# Patient Record
Sex: FEMALE | Race: WHITE | Employment: FULL TIME | URBAN - NONMETROPOLITAN AREA
[De-identification: names, ages, dates, MRNs, and addresses within clinical notes are randomized per-mention and may not be internally consistent; named-entity substitution may affect disease eponyms.]

---

## 2018-03-19 ENCOUNTER — OFFICE VISIT (OUTPATIENT)
Dept: FAMILY MEDICINE CLINIC | Age: 38
End: 2018-03-19

## 2018-03-19 VITALS
SYSTOLIC BLOOD PRESSURE: 110 MMHG | DIASTOLIC BLOOD PRESSURE: 72 MMHG | HEART RATE: 121 BPM | OXYGEN SATURATION: 97 % | BODY MASS INDEX: 31.98 KG/M2 | WEIGHT: 173.8 LBS | TEMPERATURE: 99.3 F | HEIGHT: 62 IN

## 2018-03-19 DIAGNOSIS — R00.0 TACHYCARDIA: ICD-10-CM

## 2018-03-19 DIAGNOSIS — R07.81 PLEURITIC CHEST PAIN: Primary | ICD-10-CM

## 2018-03-19 PROCEDURE — 1036F TOBACCO NON-USER: CPT | Performed by: FAMILY MEDICINE

## 2018-03-19 PROCEDURE — G8484 FLU IMMUNIZE NO ADMIN: HCPCS | Performed by: FAMILY MEDICINE

## 2018-03-19 PROCEDURE — G8427 DOCREV CUR MEDS BY ELIG CLIN: HCPCS | Performed by: FAMILY MEDICINE

## 2018-03-19 PROCEDURE — 99214 OFFICE O/P EST MOD 30 MIN: CPT | Performed by: FAMILY MEDICINE

## 2018-03-19 PROCEDURE — G8417 CALC BMI ABV UP PARAM F/U: HCPCS | Performed by: FAMILY MEDICINE

## 2018-03-19 ASSESSMENT — ENCOUNTER SYMPTOMS
DIARRHEA: 0
NAUSEA: 0
COUGH: 0
SHORTNESS OF BREATH: 1
SORE THROAT: 0

## 2018-03-19 ASSESSMENT — PATIENT HEALTH QUESTIONNAIRE - PHQ9
SUM OF ALL RESPONSES TO PHQ9 QUESTIONS 1 & 2: 0
SUM OF ALL RESPONSES TO PHQ QUESTIONS 1-9: 0
2. FEELING DOWN, DEPRESSED OR HOPELESS: 0
1. LITTLE INTEREST OR PLEASURE IN DOING THINGS: 0

## 2019-01-28 ENCOUNTER — TELEPHONE (OUTPATIENT)
Dept: FAMILY MEDICINE CLINIC | Age: 39
End: 2019-01-28

## 2019-01-28 RX ORDER — OSELTAMIVIR PHOSPHATE 75 MG/1
75 CAPSULE ORAL DAILY
Qty: 10 CAPSULE | Refills: 0 | Status: SHIPPED | OUTPATIENT
Start: 2019-01-28 | End: 2019-02-07

## 2020-09-28 ENCOUNTER — OFFICE VISIT (OUTPATIENT)
Dept: FAMILY MEDICINE CLINIC | Age: 40
End: 2020-09-28
Payer: COMMERCIAL

## 2020-09-28 VITALS
TEMPERATURE: 98.2 F | HEART RATE: 72 BPM | BODY MASS INDEX: 29.63 KG/M2 | WEIGHT: 161 LBS | HEIGHT: 62 IN | OXYGEN SATURATION: 98 % | DIASTOLIC BLOOD PRESSURE: 88 MMHG | SYSTOLIC BLOOD PRESSURE: 122 MMHG

## 2020-09-28 PROCEDURE — 36415 COLL VENOUS BLD VENIPUNCTURE: CPT | Performed by: FAMILY MEDICINE

## 2020-09-28 PROCEDURE — 99396 PREV VISIT EST AGE 40-64: CPT | Performed by: FAMILY MEDICINE

## 2020-09-28 RX ORDER — NORGESTIMATE AND ETHINYL ESTRADIOL 0.25-0.035
KIT ORAL
COMMUNITY
Start: 2020-09-24

## 2020-09-28 ASSESSMENT — ENCOUNTER SYMPTOMS
BLOOD IN STOOL: 0
SHORTNESS OF BREATH: 0
CONSTIPATION: 0
DIARRHEA: 0

## 2020-09-28 ASSESSMENT — PATIENT HEALTH QUESTIONNAIRE - PHQ9
SUM OF ALL RESPONSES TO PHQ QUESTIONS 1-9: 0
1. LITTLE INTEREST OR PLEASURE IN DOING THINGS: 0
SUM OF ALL RESPONSES TO PHQ9 QUESTIONS 1 & 2: 0
2. FEELING DOWN, DEPRESSED OR HOPELESS: 0
SUM OF ALL RESPONSES TO PHQ QUESTIONS 1-9: 0

## 2020-09-28 NOTE — PROGRESS NOTES
Chief Complaint   Patient presents with    Annual Exam     Patient here for annual exam.       HPI:  Al Santos is a 35 y.o. (: 1980) here today   for annual wellness exam.    Overall doing well. Sig family hx of colon cancer. Sister w/ cancer. Brother and sister w/ polyps. Dad w/ colon cancer. Had presented around age 35. No changes in bowel habits or blood in stool. Has been having ongoing issues w/ fatigue. Does have some daytime sleepiness. Does snore. No apneic episodes. Does have a family hx of thyroid issues. HPI    Patient's medications, allergies, past medical, surgical, social and family histories were reviewed and updated as appropriate. ROS:  Review of Systems   Constitutional: Positive for fatigue. Negative for fever. Respiratory: Negative for shortness of breath. Cardiovascular: Negative for chest pain and palpitations. Gastrointestinal: Negative for blood in stool, constipation and diarrhea. No results found for: LABA1C, LABMICR, LDLCALC    No past medical history on file.     Family History   Problem Relation Age of Onset    Colon Cancer Father        Social History     Socioeconomic History    Marital status:      Spouse name: Not on file    Number of children: Not on file    Years of education: Not on file    Highest education level: Not on file   Occupational History    Not on file   Social Needs    Financial resource strain: Not on file    Food insecurity     Worry: Not on file     Inability: Not on file    Transportation needs     Medical: Not on file     Non-medical: Not on file   Tobacco Use    Smoking status: Never Smoker    Smokeless tobacco: Never Used   Substance and Sexual Activity    Alcohol use: Not on file    Drug use: Not on file    Sexual activity: Not on file   Lifestyle    Physical activity     Days per week: Not on file     Minutes per session: Not on file    Stress: Not on file   Relationships    Social connections     Talks on phone: Not on file     Gets together: Not on file     Attends Congregational service: Not on file     Active member of club or organization: Not on file     Attends meetings of clubs or organizations: Not on file     Relationship status: Not on file    Intimate partner violence     Fear of current or ex partner: Not on file     Emotionally abused: Not on file     Physically abused: Not on file     Forced sexual activity: Not on file   Other Topics Concern    Not on file   Social History Narrative    Not on file       Prior to Visit Medications    Medication Sig Taking? Authorizing Provider   norgestimate-ethinyl estradiol (ORTHO-CYCLEN) 0.25-35 MG-MCG per tablet   Historical Provider, MD       No Known Allergies    OBJECTIVE:    /88   Pulse 72   Temp 98.2 °F (36.8 °C)   Ht 5' 2\" (1.575 m)   Wt 161 lb (73 kg)   SpO2 98%   BMI 29.45 kg/m²     BP Readings from Last 2 Encounters:   09/28/20 122/88   03/19/18 110/72       Wt Readings from Last 3 Encounters:   09/28/20 161 lb (73 kg)   03/19/18 173 lb 12.8 oz (78.8 kg)   10/24/16 152 lb (68.9 kg)       Physical Exam  Constitutional:       Appearance: She is well-developed. HENT:      Head: Normocephalic and atraumatic. Right Ear: Hearing normal.      Left Ear: Hearing normal.   Eyes:      Conjunctiva/sclera: Conjunctivae normal.   Neck:      Trachea: No tracheal deviation. Cardiovascular:      Rate and Rhythm: Normal rate and regular rhythm. Pulmonary:      Effort: Pulmonary effort is normal.      Breath sounds: Normal breath sounds. Abdominal:      Palpations: Abdomen is soft. Tenderness: There is no abdominal tenderness. Musculoskeletal:      Right lower leg: No edema. Left lower leg: No edema. Skin:     General: Skin is warm and dry. Neurological:      Mental Status: She is alert and oriented to person, place, and time.    Psychiatric:         Mood and Affect: Mood normal.         Behavior: Behavior normal. ASSESSMENT/PLAN:    1. Well adult exam  Overall doing well. Family hx of colon cancer.    - Comprehensive Metabolic Panel  - CBC Auto Differential  - Lipid Panel    2. Family history of colon cancer  See above. Refer for eval.  Will likely need Angelic Morel MD, Gastroenterology, [facility]    3. Other fatigue  Denies sig heavy menstruation. Does snore and some daytime sleepiness. Consider sleep study if labs unremarkable. - T4, Free  - Comprehensive Metabolic Panel  - TSH without Reflex  - CBC Auto Differential  - Vitamin B12    4.  Screening cholesterol level    - Lipid Panel

## 2020-09-29 LAB
A/G RATIO: 1.9 (ref 1.1–2.2)
ALBUMIN SERPL-MCNC: 4.4 G/DL (ref 3.4–5)
ALP BLD-CCNC: 80 U/L (ref 40–129)
ALT SERPL-CCNC: 12 U/L (ref 10–40)
ANION GAP SERPL CALCULATED.3IONS-SCNC: 11 MMOL/L (ref 3–16)
AST SERPL-CCNC: 14 U/L (ref 15–37)
BASOPHILS ABSOLUTE: 0 K/UL (ref 0–0.2)
BASOPHILS RELATIVE PERCENT: 1 %
BILIRUB SERPL-MCNC: 0.3 MG/DL (ref 0–1)
BUN BLDV-MCNC: 7 MG/DL (ref 7–20)
CALCIUM SERPL-MCNC: 9.5 MG/DL (ref 8.3–10.6)
CHLORIDE BLD-SCNC: 103 MMOL/L (ref 99–110)
CHOLESTEROL, TOTAL: 190 MG/DL (ref 0–199)
CO2: 26 MMOL/L (ref 21–32)
CREAT SERPL-MCNC: 0.6 MG/DL (ref 0.6–1.1)
EOSINOPHILS ABSOLUTE: 0.1 K/UL (ref 0–0.6)
EOSINOPHILS RELATIVE PERCENT: 2 %
GFR AFRICAN AMERICAN: >60
GFR NON-AFRICAN AMERICAN: >60
GLOBULIN: 2.3 G/DL
GLUCOSE BLD-MCNC: 77 MG/DL (ref 70–99)
HCT VFR BLD CALC: 39.3 % (ref 36–48)
HDLC SERPL-MCNC: 63 MG/DL (ref 40–60)
HEMOGLOBIN: 13.2 G/DL (ref 12–16)
LDL CHOLESTEROL CALCULATED: 111 MG/DL
LYMPHOCYTES ABSOLUTE: 1.7 K/UL (ref 1–5.1)
LYMPHOCYTES RELATIVE PERCENT: 32.9 %
MCH RBC QN AUTO: 30.5 PG (ref 26–34)
MCHC RBC AUTO-ENTMCNC: 33.7 G/DL (ref 31–36)
MCV RBC AUTO: 90.3 FL (ref 80–100)
MONOCYTES ABSOLUTE: 0.4 K/UL (ref 0–1.3)
MONOCYTES RELATIVE PERCENT: 8.3 %
NEUTROPHILS ABSOLUTE: 2.9 K/UL (ref 1.7–7.7)
NEUTROPHILS RELATIVE PERCENT: 55.8 %
PDW BLD-RTO: 13.4 % (ref 12.4–15.4)
PLATELET # BLD: 239 K/UL (ref 135–450)
PLATELET SLIDE REVIEW: ADEQUATE
PMV BLD AUTO: 9.8 FL (ref 5–10.5)
POTASSIUM SERPL-SCNC: 4.3 MMOL/L (ref 3.5–5.1)
RBC # BLD: 4.35 M/UL (ref 4–5.2)
SODIUM BLD-SCNC: 140 MMOL/L (ref 136–145)
T4 FREE: 1.2 NG/DL (ref 0.9–1.8)
TOTAL PROTEIN: 6.7 G/DL (ref 6.4–8.2)
TRIGL SERPL-MCNC: 79 MG/DL (ref 0–150)
TSH SERPL DL<=0.05 MIU/L-ACNC: 0.95 UIU/ML (ref 0.27–4.2)
VITAMIN B-12: 402 PG/ML (ref 211–911)
VLDLC SERPL CALC-MCNC: 16 MG/DL
WBC # BLD: 5.1 K/UL (ref 4–11)

## 2020-09-29 NOTE — RESULT ENCOUNTER NOTE
Cbc normal.  Thyroid labs normal. b12 low normal.  rec otc supplement of 1000mcg b12 daily. cmp normal.  Lipids fair.   Send diet info for her and her

## 2020-11-02 ENCOUNTER — INITIAL CONSULT (OUTPATIENT)
Dept: GASTROENTEROLOGY | Age: 40
End: 2020-11-02
Payer: COMMERCIAL

## 2020-11-02 VITALS
DIASTOLIC BLOOD PRESSURE: 70 MMHG | HEIGHT: 65 IN | TEMPERATURE: 96.7 F | SYSTOLIC BLOOD PRESSURE: 118 MMHG | WEIGHT: 163 LBS | BODY MASS INDEX: 27.16 KG/M2

## 2020-11-02 PROCEDURE — 99203 OFFICE O/P NEW LOW 30 MIN: CPT | Performed by: INTERNAL MEDICINE

## 2020-11-02 PROCEDURE — G8427 DOCREV CUR MEDS BY ELIG CLIN: HCPCS | Performed by: INTERNAL MEDICINE

## 2020-11-02 PROCEDURE — G8419 CALC BMI OUT NRM PARAM NOF/U: HCPCS | Performed by: INTERNAL MEDICINE

## 2020-11-02 PROCEDURE — G8484 FLU IMMUNIZE NO ADMIN: HCPCS | Performed by: INTERNAL MEDICINE

## 2020-11-02 PROCEDURE — 1036F TOBACCO NON-USER: CPT | Performed by: INTERNAL MEDICINE

## 2020-11-02 RX ORDER — POLYETHYLENE GLYCOL 3350 17 G/17G
238 POWDER ORAL DAILY
Qty: 255 G | Refills: 0 | Status: SHIPPED | OUTPATIENT
Start: 2020-11-02

## 2020-11-02 NOTE — LETTER
[address] ,  [address] [facility]  Phone: [phone]   DCR:[phone]  [address] [place] [address],  [address] [place] [address], [address] [place] [address]  Phone: [phone]   DUW:[phone]    11/02/20    Patient:Brionna Masters  MR Number:<[id number]>  YOB: 1980  Date of Visit:11/2/20    Dear Dr. Sakshi Soni MD    Thank you for the request for consultation for Michael Ulloa to me for the evaluation of   Chief Complaint   Patient presents with   [facility].     NP-due for colon   . Below are the relevant portions of my assessment and plan of care. FINAL DIAGNOSIS/Assessment   Diagnosis Orders   1. Preop testing  Covid-19 Ambulatory   2. Family history of colon cancer  COLONOSCOPY W/ OR W/O BIOPSY    bisacodyl (DULCOLAX) 5 MG EC tablet    polyethylene glycol (MIRALAX) 17 GM/SCOOP POWD powder       VISIT ORDERS/Plan  Orders Placed This Encounter   Procedures    COLONOSCOPY W/ OR W/O BIOPSY     Scheduling Instructions:      Please provide prep of choice instructions and prescription. General guidelines for holding blood thinners/anticoagulants around endoscopic procedure are but patients are encouraged to check with their prescribing physician. The patient may hold Plavix, Effient, Brilinta 5 days prior to the procedure unless:       A drug eluting stent has been placed within past 12 months. A nondrug eluting stent has been placed within past 1 month. Coumadin may be held 4 days prior to the procedure unless:        Mechanical mitral valve replacement (requires heparin bridge while Coumadin held and is managed by pharmacy)      Pradaxa, Xarelto, Eliquis may be held 2-3 days prior to procedure.   According to pharmacokinetics of the drug, package insert, cardiology practice patterns, and T1/2 of theses drugs (12 hrs), Eliquis and Xarelto are held 48hrs prior to any procedure, including major surgical procedures w/o increased bleeding.  That is usually the standard of care, as coagulation would/should be normalized at 48hrs. Every attempt should be made to maintain ASA 81mg per day throughout the keshawn-operative period in patients with diagnosis of ASHD. These recommendations may need to be modified by the provider/ based on risk /benefit analysis of the procedure and the patients history. If anticoagulation can not be held because recent cardiac stent, elective endoscopic procedures should be delayed until they have received the minimum duration of recommended antiplatlet therapy and it can safely be held. Again if unsure, patient should discuss with prescribing physician/service. If anticoagulation can not be stopped, endoscopic procedures can still be performed either diagnostically at a somewhat higher risk. Understand that any therapeutic procedure where anything beyond looking is performed, carries higher risks. For this reason without overt bleeding other testing       such as cologuard may be more appropriate. High risk endoscopic procedures that require stopping antiplatelet and anticoagulation therapy include polypectomy, biliary or pancreatic sphincterotomy, pneumatic or bougie dilation, PEG placement, therapeutic balloon-assisted enteroscopy, EUS and FNA, tumor ablation by any technique,       cystogastrostomy,and treatment of varices.     Covid-19 Ambulatory     Standing Status:   Future     Standing Expiration Date:   11/2/2021     Scheduling Instructions:      Saline media preferred given current shortage of viral transport media but both acceptable     Order Specific Question:   Status     Answer:   Asymptomatic/Surveillance (e.g. pre-op/pre-procedure, pre-delivery, transfer)     Order Specific Question:   Reason for Test     Answer:   Upcoming elective surgery/procedure/delivery, return to work, or discharge to another facility If you have questions, please do not hesitate to call me. I look forward to following Michael Ulloa along with you.     Sincerely,        Agnes Valero 11/2/20 9:28 AM EST

## 2020-11-02 NOTE — PATIENT INSTRUCTIONS
Noah Dumont    [address] [place] [address] ,  [address] [facility]  Phone: [phone]  [facility],  [address] [place] [address], [address] [place] [address]  Phone: [phone]    Sedation  Three types of sedation are used for endoscopy and colonoscopy. The standard and most common is called conscious sedation. This is administered by the gastroenterologist and is part of the standard procedure. Common medications used for this are IV forms of a benzodiazepine (most commonly Versed) and a narcotic (most commonly fentanyl). Benadryl and nausea medicines may also be used. The effect of this is to make you comfortable. Most people will actually have amnesia with this and not recall the procedure. Some individuals will have other types of anesthesia provided by an anesthesiologist or nurse anesthetist.  The reason for this is a history of poor sedation, medication use that makes one more resistant to conscious sedation, a medical condition for which conscious sedation is contraindicated or other medical unstable conditions. This usually involves a separate fee from anesthesia. The most common of these is propofol (diprivan sedation) which is a deeper sedative the conscious sedation. In some instances, general anesthesia with intubation (breathing tube) is required. If you need to cancel or reschedule, please do so at least 2 weeks before the procedure, so that we can be considerate to other patients who are waiting to be scheduled. If you cancel or reschedule less than 7 days before your procedure, you will be placed on a lower priority list.    COLONOSCOPY OVERVIEW  A colonoscopy is an exam of the lower part of the gastrointestinal tract, which is called the colon or large intestine (bowel). Colonoscopy is a safe procedure that provides information other tests may not be able to give.  Patients who require colonoscopy often have questions and concerns about the procedure. Colonoscopy is performed by inserting a device called a colonoscope into the anus and advanced through the entire colon. The procedure generally takes between 20 minutes and one hour. Other tests that are sometimes used to screen for colon cancer, like virtual colonoscopy (also called CT colonography), are discussed separately. More detailed information about colonoscopy is available by subscription. REASONS FOR COLONOSCOPY   The most common reasons for colonoscopy are to evaluate the following:        As a screening exam for colon cancer      Rectal bleeding      A change in bowel habits, like persistent diarrhea      Iron deficiency anemia (a decrease in blood count due to loss of iron)      A family history of colon cancer      As a follow-up test in people with colon polyps or colon cancer      Chronic, unexplained abdominal or rectal pain      An abnormal X-ray exam, like a barium enema or CT scan    COLONOSCOPY PREPARATION  Before colonoscopy, your colon must be completely cleaned out so that the doctor can see any abnormal areas. To clean the colon, you will take a strong laxative and empty your bowels the night before your test.    Your doctor's office will provide specific instructions about how you should prepare for colonoscopy. Be sure to read these instructions ahead of time so you will be prepared for the prep. If you have questions, call the doctor's office in advance.     You will need to avoid solid food for at least one day before the test. You should also drink plenty of fluids on the day before the test. You can drink clear liquids up to several hours before your procedure, including:        Water      Clear broth (beef, chicken, or vegetable)      Coffee or tea (without milk)      Ices      Gelatin (avoid red gelatin)    The day or night before the colonoscopy, you will take a laxative in two parts:        A pill that you take by mouth      A powder that is mixed with water    The most common laxative treatment is called Go-Lytely® or Half-Lytely®. You can add a flavoring (included), which, unfortunately, only partially hides the unpleasant taste. Most doctors do not recommend that you add other flavorings to the solution. Refrigerating the solution can make it easier to drink. Drinking this solution may be the most unpleasant part of the exam. You will begin to have watery diarrhea within a short time after drinking the solution. If you become nauseated or vomit while drinking the solution, call your doctor or nurse for instructions. Medicines - You can take most prescription and nonprescription medicines right up to the day of the colonoscopy. Your doctor should tell you what medicines to stop. You should also tell the doctor if you are allergic to any medicines. Some medicines increase the risk of heavy bleeding if you have a biopsy during the colonoscopy. Ask your doctor how and when to stop these medicines, including warfarin/Coumadin® and clopidogrel/Plavix®. Transportation home - You will be given a sedative (a medicine to help you relax) during the colonoscopy, so you will need someone to take you home after your test. Although you will be awake by the time you go home, the sedative medicines cause changes in reflexes and judgment that can interfere with your ability to make decisions, similar to the effect of alcohol. WHAT TO EXPECT  Before the test, a doctor will review the test, including possible complications, and will ask you to sign a consent form. The nurse will start an IV line in your hand or arm. Your blood pressure and heart rate will be monitored during the test.    THE COLONOSCOPY PROCEDURE  You will be given fluid and medicines through an IV line. Many people sleep during the test, while others are very relaxed, comfortable, and generally not aware. The colonoscope is a flexible tube, approximately the size of the index finger.  The scope pumps air into the colon to inflate it and allow the doctor to see the entire lining. You might feel bloating or gas cramps as the air opens the colon. Try not to be embarrassed about passing this gas, and let your doctor know if you are uncomfortable. During the procedure, the doctor might take a biopsy (small pieces of tissue) or remove polyps. Polyps are growths of tissue that can range in size from the tip of a pen to several inches. Most polyps are benign (not cancerous). However, some polyps can become cancerous if allowed to grow for a long time. Having a polyp removed does not hurt. RECOVERY FROM COLONOSCOPY  After the colonoscopy, you will be observed in a recovery area until the effects of the sedative medication wear off. The most common complaint after colonoscopy is a feeling of bloating and gas cramps. You may also feel groggy from the sedation medications. You should not return to work or drive that day. Most people are able to eat normally after the test. Ask your doctor when it is safe to restart aspirin and other blood-thinning medications. COLONOSCOPY COMPLICATIONS  Colonoscopy is a safe procedure, and complications are rare but can occur:        Bleeding can occur from biopsies or the removal of polyps, but it is usually minimal and can be controlled. The colonoscope can cause a tear or hole (perforation) in the colon. This is a serious problem, but it does not happen commonly. It is possible to have side effects from the sedative medicines. Although colonoscopy is the best test to examine the colon, it is possible for even the most skilled doctors to miss or overlook an abnormal area in the colon.     You should call your doctor immediately if you have any of the following:        Severe abdominal pain (not just gas cramps)      A firm, bloated abdomen      Vomiting      Fever      Rectal bleeding (greater than a few tablespoons)    AFTER COLONOSCOPY  Although many people

## 2020-11-02 NOTE — PROGRESS NOTES
[address] ,  [address] [facility]  Phone: [phone] [facility]     Chief Complaint   Patient presents with   [address]     NP-due for colon       HPI (location/symptom, timing/onset, duration, quality/severity, context, modifying factors, and associated signs/symptoms)     Thank you Lew Sims MD for asking me to see Niurka Phipps in consultation. She is a  [2] White [1] 38 y.o. L[place] Snare female  seen independently who presents with the following GI complaints:    Niurka Phipps  Is here because a family history colon cancer in her sister at 52 and father in his early 61. No associated symptoms. dysphagia, cough, chest pain, globus, regurgitation, nausea, or vomiting, Constipation, diarrhea, or bleeding. No other cancers in the family. Last Encounter Reviewed:   Pertinent PMH, FH, SH is reviewed below. Last EGD: none  Last Colonoscopy: none    Review of available records reveals: Wt Readings from Last 50 Encounters:   11/02/20 163 lb (73.9 kg)   09/28/20 161 lb (73 kg)   03/19/18 173 lb 12.8 oz (78.8 kg)   10/24/16 152 lb (68.9 kg)       No components found for: HGBA1C  BP Readings from Last 3 Encounters:   11/02/20 118/70   09/28/20 122/88   03/19/18 110/72     Health Maintenance   Topic Date Due    DTaP/Tdap/Td vaccine (1 - Tdap) 10/12/1998    Cervical cancer screen  10/12/2000    Flu vaccine (1) 09/28/2021 (Originally 9/1/2020)    Lipid screen  09/28/2025    Hepatitis A vaccine  Aged Out    Hepatitis B vaccine  Aged Out    Hib vaccine  Aged Out    Meningococcal (ACWY) vaccine  Aged Out    Pneumococcal 0-64 years Vaccine  Aged Out    HIV screen  Discontinued       No components found for: [address] [place] [address]   No past medical history on file.   FAMILY HISTORY     Family History   Problem Relation Age of Onset    Colon Cancer Father      SOCIAL HISTORY     Social History     Socioeconomic Allergies  IMMUNIZATIONS     There is no immunization history on file for this patient. REVIEW OF SYSTEMS (2-9 systems for level 4, 10 or more for level 5)   See HPI for further details and pertinent postiives. Negative for the following:  Constitutional: Negative for weight change. Negative for appetite change and fatigue. HENT: Negative for nosebleeds, sore throat, mouth sores, and voice change. Respiratory: Negative for cough, choking and chest tightness. Cardiovascular: Negative for chest pain   Gastrointestinal: No abdominal pain, heartburn, bloating, dysphagia, cough, chest pain, globus, regurgitation, diarrhea, constipation, nausea, or vomiting. Positive for family history of colon cancer. Musculoskeletal: Negative for arthralgias. Skin: Negative for pallor. Neurological: Negative for weakness and light-headedness. Hematological: Negative for adenopathy. Does not bruise/bleed easily. Psychiatric/Behavioral: Negative for suicidal ideas. PHYSICAL EXAM   VITAL SIGNS: /70 (Site: Left Upper Arm)   Temp 96.7 °F (35.9 °C)   Ht 5' 5\" (1.651 m)   Wt 163 lb (73.9 kg)   BMI 27.12 kg/m²   Wt Readings from Last 3 Encounters:   11/02/20 163 lb (73.9 kg)   09/28/20 161 lb (73 kg)   03/19/18 173 lb 12.8 oz (78.8 kg)     Constitutional: Well developed, Well nourished, No acute distress, Non-toxic appearance. HENT: Normocephalic, Atraumatic, Bilateral external ears normal, Oropharynx moist, No oral exudates, Nose normal.   Eyes: Conjunctiva normal, No discharge. Neck: Normal range of motion, No tenderness, Supple, No stridor. Lymphatic: No cervical, subclavian, or axillary lymphadenopathy. Cardiovascular: Normal heart rate, Normal rhythm, No murmurs, No rubs, No gallops. Thorax & Lungs: Normal breath sounds, No respiratory distress, No wheezing, No chest tenderness. No gynecomastia.   Abdomen/GI: scars consistent with stated surgeries, no hernias, no HSM, soft NTND   Rectal: procedures should be delayed until they have received the minimum duration of recommended antiplatlet therapy and it can safely be held. Again if unsure, patient should discuss with prescribing physician/service. If anticoagulation can not be stopped, endoscopic procedures can still be performed either diagnostically at a somewhat higher risk. Understand that any therapeutic procedure where anything beyond looking is performed, carries higher risks. For this reason without overt bleeding other testing       such as cologuard may be more appropriate. High risk endoscopic procedures that require stopping antiplatelet and anticoagulation therapy include polypectomy, biliary or pancreatic sphincterotomy, pneumatic or bougie dilation, PEG placement, therapeutic balloon-assisted enteroscopy, EUS and FNA, tumor ablation by any technique,       cystogastrostomy,and treatment of varices.  Covid-19 Ambulatory     Standing Status:   Future     Standing Expiration Date:   11/2/2021     Scheduling Instructions:      Saline media preferred given current shortage of viral transport media but both acceptable     Order Specific Question:   Status     Answer:   Asymptomatic/Surveillance (e.g. pre-op/pre-procedure, pre-delivery, transfer)     Order Specific Question:   Reason for Test     Answer:   Upcoming elective surgery/procedure/delivery, return to work, or discharge to another facility     Lebron Mohamud was seen today for establish care. Diagnoses and all orders for this visit:    Preop testing  -     Covid-19 Ambulatory; Future    Family history of colon cancer  -     COLONOSCOPY W/ OR W/O BIOPSY  -     bisacodyl (DULCOLAX) 5 MG EC tablet; Take 4 tablets by mouth once for 1 dose Take as directed for colonoscopy. -     polyethylene glycol (MIRALAX) 17 GM/SCOOP POWD powder;  Take 238 g by mouth daily Take as directed for colonoscopy        ORDERED [address]   Return for Colonoscopy.           Milagros RUSSELL 11/2/20 9:19 AM EST    CC:  Sandie Luis MD

## 2020-12-12 ENCOUNTER — HOSPITAL ENCOUNTER (OUTPATIENT)
Age: 40
Discharge: HOME | End: 2020-12-12
Payer: COMMERCIAL

## 2020-12-12 PROCEDURE — U0003 INFECTIOUS AGENT DETECTION BY NUCLEIC ACID (DNA OR RNA); SEVERE ACUTE RESPIRATORY SYNDROME CORONAVIRUS 2 (SARS-COV-2) (CORONAVIRUS DISEASE [COVID-19]), AMPLIFIED PROBE TECHNIQUE, MAKING USE OF HIGH THROUGHPUT TECHNOLOGIES AS DESCRIBED BY CMS-2020-01-R: HCPCS

## 2020-12-13 LAB — SARS-COV-2: NOT DETECTED

## 2020-12-16 NOTE — PROGRESS NOTES
PAT call for Endoscopy procedure scheduled for 12/18/2020. Spoke with pt regarding arrival time of 0900, NPO after am dose prep completed and must have a  for procedure. Pt instructed to call physician if any questions or concerns prior to procedure, phone number provided. Pt verbalized understanding, no further questions at present time.

## 2020-12-18 ENCOUNTER — HOSPITAL ENCOUNTER (OUTPATIENT)
Age: 40
Setting detail: OUTPATIENT SURGERY
Discharge: HOME | End: 2020-12-18
Attending: INTERNAL MEDICINE | Admitting: INTERNAL MEDICINE
Payer: COMMERCIAL

## 2020-12-18 VITALS
HEART RATE: 72 BPM | RESPIRATION RATE: 20 BRPM | WEIGHT: 160 LBS | HEIGHT: 62 IN | TEMPERATURE: 98.2 F | OXYGEN SATURATION: 99 % | SYSTOLIC BLOOD PRESSURE: 114 MMHG | BODY MASS INDEX: 29.44 KG/M2 | DIASTOLIC BLOOD PRESSURE: 78 MMHG

## 2020-12-18 LAB — PREGNANCY, URINE: NEGATIVE

## 2020-12-18 PROCEDURE — 99152 MOD SED SAME PHYS/QHP 5/>YRS: CPT | Performed by: INTERNAL MEDICINE

## 2020-12-18 PROCEDURE — 3609010300 HC COLONOSCOPY W/BIOPSY SINGLE/MULTIPLE: Performed by: INTERNAL MEDICINE

## 2020-12-18 PROCEDURE — 84703 CHORIONIC GONADOTROPIN ASSAY: CPT

## 2020-12-18 PROCEDURE — 2709999900 HC NON-CHARGEABLE SUPPLY: Performed by: INTERNAL MEDICINE

## 2020-12-18 PROCEDURE — 88305 TISSUE EXAM BY PATHOLOGIST: CPT

## 2020-12-18 PROCEDURE — 6360000002 HC RX W HCPCS: Performed by: INTERNAL MEDICINE

## 2020-12-18 PROCEDURE — 7100000010 HC PHASE II RECOVERY - FIRST 15 MIN: Performed by: INTERNAL MEDICINE

## 2020-12-18 PROCEDURE — 45380 COLONOSCOPY AND BIOPSY: CPT | Performed by: INTERNAL MEDICINE

## 2020-12-18 PROCEDURE — 99153 MOD SED SAME PHYS/QHP EA: CPT | Performed by: INTERNAL MEDICINE

## 2020-12-18 PROCEDURE — 7100000011 HC PHASE II RECOVERY - ADDTL 15 MIN: Performed by: INTERNAL MEDICINE

## 2020-12-18 RX ORDER — MIDAZOLAM HYDROCHLORIDE 5 MG/ML
INJECTION INTRAMUSCULAR; INTRAVENOUS PRN
Status: DISCONTINUED | OUTPATIENT
Start: 2020-12-18 | End: 2020-12-18 | Stop reason: ALTCHOICE

## 2020-12-18 RX ORDER — FENTANYL CITRATE 50 UG/ML
INJECTION, SOLUTION INTRAMUSCULAR; INTRAVENOUS PRN
Status: DISCONTINUED | OUTPATIENT
Start: 2020-12-18 | End: 2020-12-18 | Stop reason: ALTCHOICE

## 2020-12-18 ASSESSMENT — PAIN SCALES - GENERAL
PAINLEVEL_OUTOF10: 0
PAINLEVEL_OUTOF10: 0

## 2020-12-18 ASSESSMENT — PAIN - FUNCTIONAL ASSESSMENT: PAIN_FUNCTIONAL_ASSESSMENT: 0-10

## 2020-12-18 NOTE — PROGRESS NOTES
Instructions reviewed with  via phone, verbalizes understanding. Patient discharged to home with  via wheelchair.

## 2020-12-18 NOTE — H&P
[address] ,  [address] [facility]  Phone: [phone]     CHIEF COMPLAINT           Chief Complaint   Patient presents with   [facility]       NP-due for colon         HPI (location/symptom, timing/onset, duration, quality/severity, context, modifying factors, and associated signs/symptoms)      Thank you Clary Lockwood MD for asking me to see Moises Olszewski in consultation. She is a  [2] White [1] 38 y.o. [place]  female  seen independently who presents with the following GI complaints:     Moises Olszewski  Is here because a family history colon cancer in her sister at 52 and father in his early 61. No associated symptoms. dysphagia, cough, chest pain, globus, regurgitation, nausea, or vomiting, Constipation, diarrhea, or bleeding. No other cancers in the family.     Last Encounter Reviewed:   Pertinent PMH, FH, SH is reviewed below. Last EGD: none  Last Colonoscopy: none     Review of available records reveals: Wt Readings from Last 50 Encounters:   11/02/20 163 lb (73.9 kg)   09/28/20 161 lb (73 kg)   03/19/18 173 lb 12.8 oz (78.8 kg)   10/24/16 152 lb (68.9 kg)         No components found for: HGBA1C      BP Readings from Last 3 Encounters:   11/02/20 118/70   09/28/20 122/88   03/19/18 110/72           Health Maintenance   Topic Date Due    DTaP/Tdap/Td vaccine (1 - Tdap) 10/12/1998    Cervical cancer screen  10/12/2000    Flu vaccine (1) 09/28/2021 (Originally 9/1/2020)    Lipid screen  09/28/2025    Hepatitis A vaccine  Aged Out    Hepatitis B vaccine  Aged Out    Hib vaccine  Aged Out    Meningococcal (ACWY) vaccine  Aged Out    Pneumococcal 0-64 years Vaccine  Aged Out    HIV screen  Discontinued         No components found for: [address] HISTORY   Past Medical History   No past medical history on file.      FAMILY HISTORY      Family History         Family History   Problem Relation Age of Onset    Colon Cancer Father           SOCIAL HISTORY      Social History               Socioeconomic History    Marital status:        Spouse name: Not on file    Number of children: Not on file    Years of education: Not on file    Highest education level: Not on file   Occupational History    Not on file   Social Needs    Financial resource strain: Not on file    Food insecurity       Worry: Not on file       Inability: Not on file    Transportation needs       Medical: Not on file       Non-medical: Not on file   Tobacco Use    Smoking status: Never Smoker    Smokeless tobacco: Never Used   Substance and Sexual Activity    Alcohol use: Not on file    Drug use: Not on file    Sexual activity: Not on file   Lifestyle    Physical activity       Days per week: Not on file       Minutes per session: Not on file    Stress: Not on file   Relationships    Social connections       Talks on phone: Not on file       Gets together: Not on file       Attends Denominational service: Not on file       Active member of club or organization: Not on file       Attends meetings of clubs or organizations: Not on file       Relationship status: Not on file    Intimate partner violence       Fear of current or ex partner: Not on file       Emotionally abused: Not on file       Physically abused: Not on file       Forced sexual activity: Not on file   Other Topics Concern    Not on file   Social History Narrative    Not on file         SURGICAL HISTORY   Past Surgical History   No past surgical history on file. CURRENT MEDICATIONS   (This list may include medications prescribed during this encounter as epic can not insert only the list prior to this encounter.)  Current Outpatient Rx          Current Outpatient Rx   Medication Sig Dispense Refill    bisacodyl (DULCOLAX) 5 MG EC tablet Take 4 tablets by mouth once for 1 dose Take as directed for colonoscopy.  4 tablet 0    polyethylene glycol (MIRALAX) 17 GM/SCOOP POWD powder Take 238 g by mouth daily Take as directed for colonoscopy 255 g 0    norgestimate-ethinyl estradiol (ORTHO-CYCLEN) 0.25-35 MG-MCG per tablet               ALLERGIES   No Known Allergies  IMMUNIZATIONS      There is no immunization history on file for this patient. REVIEW OF SYSTEMS (2-9 systems for level 4, 10 or more for level 5)   See HPI for further details and pertinent postiives. Negative for the following:  Constitutional: Negative for weight change. Negative for appetite change and fatigue. HENT: Negative for nosebleeds, sore throat, mouth sores, and voice change. Respiratory: Negative for cough, choking and chest tightness. Cardiovascular: Negative for chest pain   Gastrointestinal: No abdominal pain, heartburn, bloating, dysphagia, cough, chest pain, globus, regurgitation, diarrhea, constipation, nausea, or vomiting. Positive for family history of colon cancer. Musculoskeletal: Negative for arthralgias. Skin: Negative for pallor. Neurological: Negative for weakness and light-headedness. Hematological: Negative for adenopathy. Does not bruise/bleed easily. Psychiatric/Behavioral: Negative for suicidal ideas. PHYSICAL EXAM   VITAL SIGNS: /70 (Site: Left Upper Arm)   Temp 96.7 °F (35.9 °C)   Ht 5' 5\" (1.651 m)   Wt 163 lb (73.9 kg)   BMI 27.12 kg/m²       Wt Readings from Last 3 Encounters:   11/02/20 163 lb (73.9 kg)   09/28/20 161 lb (73 kg)   03/19/18 173 lb 12.8 oz (78.8 kg)      Constitutional: Well developed, Well nourished, No acute distress, Non-toxic appearance. HENT: Normocephalic, Atraumatic, Bilateral external ears normal, Oropharynx moist, No oral exudates, Nose normal.   Eyes: Conjunctiva normal, No discharge. Neck: Normal range of motion, No tenderness, Supple, No stridor. Lymphatic: No cervical, subclavian, or axillary lymphadenopathy. Cardiovascular: Normal heart rate, Normal rhythm, No murmurs, No rubs, No gallops.    Thorax & Lungs: Normal breath sounds, No respiratory distress, No wheezing, No chest tenderness. No gynecomastia. Abdomen/GI: scars consistent with stated surgeries, no hernias, no HSM, soft NTND   Rectal:  Deferred. Skin: Warm, Dry, No erythema, No rash. No bruising. No spider hemangiomas. Back: No tenderness, No CVA tenderness. Lower Extremities: Intact distal pulses, No edema, No tenderness, No cyanosis, No clubbing. Neurologic: Alert & oriented x 3, Normal motor function, Normal sensory function, No focal deficits noted. No asterixis. RADIOLOGY/PROCEDURES            FINAL IMPRESSION             Orders Placed This Encounter   Procedures    COLONOSCOPY W/ OR W/O BIOPSY       Scheduling Instructions:         Please provide prep of choice instructions and prescription.                     General guidelines for holding blood thinners/anticoagulants around endoscopic procedure are but patients are encouraged to check with their prescribing physician.                   The patient may hold Plavix, Effient, Brilinta 5 days prior to the procedure unless:          A drug eluting stent has been placed within past 12 months.         A nondrug eluting stent has been placed within past 1 month.         Coumadin may be held 4 days prior to the procedure unless:           Mechanical mitral valve replacement (requires heparin bridge while Coumadin held and is managed by pharmacy)         Pradaxa, Xarelto, Eliquis may be held 2-3 days prior to procedure. According to pharmacokinetics of the drug, package insert, cardiology practice patterns, and T1/2 of theses drugs (12 hrs), Eliquis and Xarelto are held 48hrs prior to any procedure, including major surgical procedures w/o          increased bleeding.  That is usually the standard of care, as coagulation would/should be normalized at 48hrs.         Every attempt should be made to maintain ASA 81mg per day throughout the keshawn-operative period in patients with diagnosis of ASHD.           These recommendations may need to be modified by the provider/ based on risk /benefit analysis of the procedure and the patients history.                   If anticoagulation can not be held because recent cardiac stent, elective endoscopic procedures should be delayed until they have received the minimum duration of recommended antiplatlet therapy and it can safely be held. Again if unsure, patient should discuss with prescribing physician/service.                   If anticoagulation can not be stopped, endoscopic procedures can still be performed either diagnostically at a somewhat higher risk. Understand that any therapeutic procedure where anything beyond looking is performed, carries higher risks. For this reason without overt bleeding other testing          such as cologuard may be more appropriate.                     High risk endoscopic procedures that require stopping antiplatelet and anticoagulation therapy include polypectomy, biliary or pancreatic sphincterotomy, pneumatic or bougie dilation, PEG placement, therapeutic balloon-assisted enteroscopy, EUS and FNA, tumor ablation by any technique,          cystogastrostomy,and treatment of varices.  Covid-19 Ambulatory       Standing Status:   Future       Standing Expiration Date:   11/2/2021       Scheduling Instructions:         Saline media preferred given current shortage of viral transport media but both acceptable       Order Specific Question:   Status       Answer:   Asymptomatic/Surveillance (e.g. pre-op/pre-procedure, pre-delivery, transfer)       Order Specific Question:   Reason for Test       Answer:   Upcoming elective surgery/procedure/delivery, return to work, or discharge to another facility      Taylor Mccartney was seen today for establish care.     Diagnoses and all orders for this visit:     Preop testing  -     Covid-19 Ambulatory;  Future     Family history of colon cancer  -     COLONOSCOPY W/ OR W/O BIOPSY  -     bisacodyl (DULCOLAX) 5 MG EC tablet; Take 4 tablets by mouth once for 1 dose Take as directed for colonoscopy. -     polyethylene glycol (MIRALAX) 17 GM/SCOOP POWD powder; Take 238 g by mouth daily Take as directed for colonoscopy           ORDERED FUTURE/PENDING TESTS         FOLLOWUP   Return for Colonoscopy. The patient was counseled at length about the risks of kenji Covid-19 during their perioperative period and any recovery window from their procedure. The patient was made aware that kenji Covid-19  may worsen their prognosis for recovering from their procedure  and lend to a higher morbidity and/or mortality risk. All material risks, benefits, and reasonable alternatives including postponing the procedure were discussed. The patient does wish to proceed with the procedure at this time. Preprocedural COVID-19 throat swab was negative.       Scar Mclaughlin 12/18/20 10:08 AM EST

## 2020-12-18 NOTE — OP NOTE
[address] ,  [address] [facility]  Phone: [phone] [facility] [address],  [address] [facility], [facility] [place]  Phone: [phone]   XOY:[phone]    Colonoscopy Procedure Note    Patient: Chauncey Almendarez  : 1980    Procedure: Colonoscopy with biopsy    Date:  2020     Endoscopist:  Reilly Aguirre MD    Referring Physician:  Marco Fields MD    Preoperative Diagnosis:  Family history of colon cancer [Z80.0]    Postoperative Diagnosis:  See impression    Anesthesia: Anesthesia: Moderate Sedation  Sedation: Versed 7 mg IV, fentanyl 100 mcg IV  Start Time: 10:12  Stop Time: 10:27  ASA Class: 1  Mallampati: I (soft palate, uvula, fauces, tonsillar pillars visible)    Indications: This is a 38y.o. year old female who presents today with family history of colon cancer. Procedure Details  Informed consent was obtained for the procedure, including sedation. Risks of perforation, hemorrhage, adverse drug reaction and aspiration were discussed. The patient was placed in the left lateral decubitus position. Based on the pre-procedure assessment, including review of the patient's medical history, medications, allergies, and review of systems, she had been deemed to be an appropriate candidate for sedation; she was therefore sedated with the medications listed below. The patient was monitored continuously with ECG tracing, pulse oximetry, blood pressure monitoring, and direct observations. rectal examination was performed. There were no external hemorrhoids, fissures or skin tags. The colonoscope was inserted into the rectum and advanced under direct vision to the cecum, which was identified by the ileocecal valve and appendiceal orifice. The right colon was examined twice as this increases polyp detection especially if other right colon polyps, older age, male, or reyna syndrome.   When segments could not be distended with CO2 or air, it was filled/distended with water. The quality of the colonic preparation was excellent. A careful inspection was made as the colonoscope was withdrawn, including a retroflexed view of the rectum; findings and interventions are described below. Appropriate photodocumentation Was Obtained. Findings: -polyp(s) - #1, 2 sessile 2-3 mm in size, located in the sigmoid, removed by cold biopsy and sent for pathology  - internal hemorrhoids  - PREP: miralax  - Overall difficulty: mild in degree  - Abdominal pressure: yes - sigmoid  - Change in position: no  - Anesthesia issues: no  - Endocoff/Amplieye use: no    Specimens: Was Obtained    Complications:   None; patient tolerated the procedure well. Disposition:   PACU - hemodynamically stable. Estimated Blood loss:  none    Withdrawal Time:  8 minutes    Impression:   -See post-procedure diagnoses. Recommendations:  -Await pathology. , -Repeat colonoscopy in 5 years.         Michi Bergman 12/18/20 10:32 AM EST

## 2020-12-28 NOTE — RESULT ENCOUNTER NOTE
Please call patient with benign results. Rec would be 10 years for this polyp however due to family history, she should continue to have colonoscopies every 5 years.

## 2021-01-05 ENCOUNTER — TELEPHONE (OUTPATIENT)
Dept: FAMILY MEDICINE CLINIC | Age: 41
End: 2021-01-05

## 2021-01-07 ENCOUNTER — NURSE ONLY (OUTPATIENT)
Dept: FAMILY MEDICINE CLINIC | Age: 41
End: 2021-01-07
Payer: COMMERCIAL

## 2021-01-07 DIAGNOSIS — E53.8 LOW VITAMIN B12 LEVEL: Primary | ICD-10-CM

## 2021-01-07 PROCEDURE — 96372 THER/PROPH/DIAG INJ SC/IM: CPT | Performed by: FAMILY MEDICINE

## 2021-01-07 RX ORDER — CYANOCOBALAMIN 1000 UG/ML
1000 INJECTION INTRAMUSCULAR; SUBCUTANEOUS ONCE
Status: COMPLETED | OUTPATIENT
Start: 2021-01-07 | End: 2021-01-07

## 2021-01-07 RX ADMIN — CYANOCOBALAMIN 1000 MCG: 1000 INJECTION INTRAMUSCULAR; SUBCUTANEOUS at 10:04

## 2021-01-12 ENCOUNTER — NURSE ONLY (OUTPATIENT)
Dept: FAMILY MEDICINE CLINIC | Age: 41
End: 2021-01-12
Payer: COMMERCIAL

## 2021-01-12 DIAGNOSIS — E53.8 LOW VITAMIN B12 LEVEL: Primary | ICD-10-CM

## 2021-01-12 PROCEDURE — 96372 THER/PROPH/DIAG INJ SC/IM: CPT | Performed by: FAMILY MEDICINE

## 2021-01-12 RX ORDER — CYANOCOBALAMIN 1000 UG/ML
1000 INJECTION INTRAMUSCULAR; SUBCUTANEOUS ONCE
Status: COMPLETED | OUTPATIENT
Start: 2021-01-12 | End: 2021-01-12

## 2021-01-12 RX ADMIN — CYANOCOBALAMIN 1000 MCG: 1000 INJECTION INTRAMUSCULAR; SUBCUTANEOUS at 08:37

## 2021-01-19 ENCOUNTER — NURSE ONLY (OUTPATIENT)
Dept: FAMILY MEDICINE CLINIC | Age: 41
End: 2021-01-19
Payer: COMMERCIAL

## 2021-01-19 DIAGNOSIS — E53.8 LOW VITAMIN B12 LEVEL: Primary | ICD-10-CM

## 2021-01-19 PROCEDURE — 96372 THER/PROPH/DIAG INJ SC/IM: CPT | Performed by: FAMILY MEDICINE

## 2021-01-19 RX ORDER — CYANOCOBALAMIN 1000 UG/ML
1000 INJECTION INTRAMUSCULAR; SUBCUTANEOUS ONCE
Status: COMPLETED | OUTPATIENT
Start: 2021-01-19 | End: 2021-01-19

## 2021-01-19 RX ADMIN — CYANOCOBALAMIN 1000 MCG: 1000 INJECTION INTRAMUSCULAR; SUBCUTANEOUS at 08:45

## 2021-01-25 ENCOUNTER — NURSE ONLY (OUTPATIENT)
Dept: FAMILY MEDICINE CLINIC | Age: 41
End: 2021-01-25
Payer: COMMERCIAL

## 2021-01-25 DIAGNOSIS — E53.8 LOW VITAMIN B12 LEVEL: Primary | ICD-10-CM

## 2021-01-25 PROCEDURE — 96372 THER/PROPH/DIAG INJ SC/IM: CPT | Performed by: FAMILY MEDICINE

## 2021-01-25 RX ORDER — CYANOCOBALAMIN 1000 UG/ML
1000 INJECTION INTRAMUSCULAR; SUBCUTANEOUS ONCE
Status: COMPLETED | OUTPATIENT
Start: 2021-01-25 | End: 2021-01-25

## 2021-01-25 RX ADMIN — CYANOCOBALAMIN 1000 MCG: 1000 INJECTION INTRAMUSCULAR; SUBCUTANEOUS at 08:45

## 2021-02-08 ENCOUNTER — NURSE ONLY (OUTPATIENT)
Dept: FAMILY MEDICINE CLINIC | Age: 41
End: 2021-02-08
Payer: COMMERCIAL

## 2021-02-08 DIAGNOSIS — E53.8 LOW VITAMIN B12 LEVEL: Primary | ICD-10-CM

## 2021-02-08 PROCEDURE — 96372 THER/PROPH/DIAG INJ SC/IM: CPT | Performed by: FAMILY MEDICINE

## 2021-02-08 RX ORDER — CYANOCOBALAMIN 1000 UG/ML
1000 INJECTION INTRAMUSCULAR; SUBCUTANEOUS ONCE
Status: COMPLETED | OUTPATIENT
Start: 2021-02-08 | End: 2021-02-08

## 2021-02-08 RX ADMIN — CYANOCOBALAMIN 1000 MCG: 1000 INJECTION INTRAMUSCULAR; SUBCUTANEOUS at 08:46

## 2021-02-22 ENCOUNTER — NURSE ONLY (OUTPATIENT)
Dept: FAMILY MEDICINE CLINIC | Age: 41
End: 2021-02-22
Payer: COMMERCIAL

## 2021-02-22 DIAGNOSIS — E53.8 LOW VITAMIN B12 LEVEL: Primary | ICD-10-CM

## 2021-02-22 PROCEDURE — 96372 THER/PROPH/DIAG INJ SC/IM: CPT | Performed by: FAMILY MEDICINE

## 2021-02-22 RX ORDER — CYANOCOBALAMIN 1000 UG/ML
1000 INJECTION INTRAMUSCULAR; SUBCUTANEOUS ONCE
Status: COMPLETED | OUTPATIENT
Start: 2021-02-22 | End: 2021-02-22

## 2021-02-22 RX ADMIN — CYANOCOBALAMIN 1000 MCG: 1000 INJECTION INTRAMUSCULAR; SUBCUTANEOUS at 08:43

## 2021-03-22 ENCOUNTER — NURSE ONLY (OUTPATIENT)
Dept: FAMILY MEDICINE CLINIC | Age: 41
End: 2021-03-22
Payer: COMMERCIAL

## 2021-03-22 DIAGNOSIS — E53.8 LOW VITAMIN B12 LEVEL: Primary | ICD-10-CM

## 2021-03-22 PROCEDURE — 96372 THER/PROPH/DIAG INJ SC/IM: CPT | Performed by: FAMILY MEDICINE

## 2021-03-22 RX ORDER — CYANOCOBALAMIN 1000 UG/ML
1000 INJECTION INTRAMUSCULAR; SUBCUTANEOUS ONCE
Status: COMPLETED | OUTPATIENT
Start: 2021-03-22 | End: 2021-03-22

## 2021-03-22 RX ADMIN — CYANOCOBALAMIN 1000 MCG: 1000 INJECTION INTRAMUSCULAR; SUBCUTANEOUS at 08:47

## 2021-04-26 ENCOUNTER — NURSE ONLY (OUTPATIENT)
Dept: FAMILY MEDICINE CLINIC | Age: 41
End: 2021-04-26
Payer: COMMERCIAL

## 2021-04-26 DIAGNOSIS — E53.8 LOW VITAMIN B12 LEVEL: Primary | ICD-10-CM

## 2021-04-26 PROCEDURE — 96372 THER/PROPH/DIAG INJ SC/IM: CPT | Performed by: FAMILY MEDICINE

## 2021-04-26 RX ORDER — CYANOCOBALAMIN 1000 UG/ML
1000 INJECTION INTRAMUSCULAR; SUBCUTANEOUS ONCE
Status: COMPLETED | OUTPATIENT
Start: 2021-04-26 | End: 2021-04-26

## 2021-04-26 RX ADMIN — CYANOCOBALAMIN 1000 MCG: 1000 INJECTION INTRAMUSCULAR; SUBCUTANEOUS at 08:36

## 2021-06-16 ENCOUNTER — NURSE ONLY (OUTPATIENT)
Dept: FAMILY MEDICINE CLINIC | Age: 41
End: 2021-06-16
Payer: COMMERCIAL

## 2021-06-16 DIAGNOSIS — E53.8 LOW VITAMIN B12 LEVEL: Primary | ICD-10-CM

## 2021-06-16 PROCEDURE — 96372 THER/PROPH/DIAG INJ SC/IM: CPT | Performed by: FAMILY MEDICINE

## 2021-06-16 RX ORDER — CYANOCOBALAMIN 1000 UG/ML
1000 INJECTION INTRAMUSCULAR; SUBCUTANEOUS ONCE
Status: COMPLETED | OUTPATIENT
Start: 2021-06-16 | End: 2021-06-16

## 2021-06-16 RX ADMIN — CYANOCOBALAMIN 1000 MCG: 1000 INJECTION INTRAMUSCULAR; SUBCUTANEOUS at 08:31

## 2021-07-16 ENCOUNTER — NURSE ONLY (OUTPATIENT)
Dept: FAMILY MEDICINE CLINIC | Age: 41
End: 2021-07-16
Payer: COMMERCIAL

## 2021-07-16 DIAGNOSIS — E53.8 LOW VITAMIN B12 LEVEL: Primary | ICD-10-CM

## 2021-07-16 PROCEDURE — 96372 THER/PROPH/DIAG INJ SC/IM: CPT | Performed by: FAMILY MEDICINE

## 2021-07-16 RX ORDER — CYANOCOBALAMIN 1000 UG/ML
1000 INJECTION INTRAMUSCULAR; SUBCUTANEOUS ONCE
Status: COMPLETED | OUTPATIENT
Start: 2021-07-16 | End: 2021-07-16

## 2021-07-16 RX ADMIN — CYANOCOBALAMIN 1000 MCG: 1000 INJECTION INTRAMUSCULAR; SUBCUTANEOUS at 08:24

## 2021-08-16 ENCOUNTER — NURSE ONLY (OUTPATIENT)
Dept: FAMILY MEDICINE CLINIC | Age: 41
End: 2021-08-16
Payer: COMMERCIAL

## 2021-08-16 DIAGNOSIS — E53.8 LOW VITAMIN B12 LEVEL: Primary | ICD-10-CM

## 2021-08-16 PROCEDURE — 96372 THER/PROPH/DIAG INJ SC/IM: CPT | Performed by: FAMILY MEDICINE

## 2021-08-16 RX ORDER — CYANOCOBALAMIN 1000 UG/ML
1000 INJECTION INTRAMUSCULAR; SUBCUTANEOUS ONCE
Status: COMPLETED | OUTPATIENT
Start: 2021-08-16 | End: 2021-08-16

## 2021-08-16 RX ORDER — CYANOCOBALAMIN 1000 UG/ML
1000 INJECTION INTRAMUSCULAR; SUBCUTANEOUS ONCE
Qty: 1 ML | Refills: 0 | Status: SHIPPED | OUTPATIENT
Start: 2021-08-16 | End: 2021-08-16 | Stop reason: CLARIF

## 2021-08-16 RX ADMIN — CYANOCOBALAMIN 1000 MCG: 1000 INJECTION INTRAMUSCULAR; SUBCUTANEOUS at 08:25

## 2021-09-15 ENCOUNTER — NURSE ONLY (OUTPATIENT)
Dept: FAMILY MEDICINE CLINIC | Age: 41
End: 2021-09-15
Payer: COMMERCIAL

## 2021-09-15 DIAGNOSIS — E53.8 LOW VITAMIN B12 LEVEL: Primary | ICD-10-CM

## 2021-09-15 PROCEDURE — 96372 THER/PROPH/DIAG INJ SC/IM: CPT | Performed by: FAMILY MEDICINE

## 2021-09-15 RX ORDER — CYANOCOBALAMIN 1000 UG/ML
1000 INJECTION INTRAMUSCULAR; SUBCUTANEOUS ONCE
Status: COMPLETED | OUTPATIENT
Start: 2021-09-15 | End: 2021-09-15

## 2021-09-15 RX ADMIN — CYANOCOBALAMIN 1000 MCG: 1000 INJECTION INTRAMUSCULAR; SUBCUTANEOUS at 08:34

## 2021-10-15 ENCOUNTER — NURSE ONLY (OUTPATIENT)
Dept: FAMILY MEDICINE CLINIC | Age: 41
End: 2021-10-15
Payer: COMMERCIAL

## 2021-10-15 DIAGNOSIS — E53.8 LOW VITAMIN B12 LEVEL: Primary | ICD-10-CM

## 2021-10-15 PROCEDURE — 96372 THER/PROPH/DIAG INJ SC/IM: CPT | Performed by: FAMILY MEDICINE

## 2021-10-15 RX ORDER — CYANOCOBALAMIN 1000 UG/ML
1000 INJECTION INTRAMUSCULAR; SUBCUTANEOUS ONCE
Status: COMPLETED | OUTPATIENT
Start: 2021-10-15 | End: 2021-10-15

## 2021-10-15 RX ADMIN — CYANOCOBALAMIN 1000 MCG: 1000 INJECTION INTRAMUSCULAR; SUBCUTANEOUS at 08:37

## 2021-11-12 ENCOUNTER — NURSE ONLY (OUTPATIENT)
Dept: FAMILY MEDICINE CLINIC | Age: 41
End: 2021-11-12
Payer: COMMERCIAL

## 2021-11-12 DIAGNOSIS — E53.8 LOW VITAMIN B12 LEVEL: Primary | ICD-10-CM

## 2021-11-12 PROCEDURE — 96372 THER/PROPH/DIAG INJ SC/IM: CPT | Performed by: FAMILY MEDICINE

## 2021-11-12 RX ORDER — CYANOCOBALAMIN 1000 UG/ML
1000 INJECTION INTRAMUSCULAR; SUBCUTANEOUS ONCE
Status: COMPLETED | OUTPATIENT
Start: 2021-11-12 | End: 2021-11-12

## 2021-11-12 RX ADMIN — CYANOCOBALAMIN 1000 MCG: 1000 INJECTION INTRAMUSCULAR; SUBCUTANEOUS at 09:05

## 2021-12-06 ENCOUNTER — TELEPHONE (OUTPATIENT)
Dept: FAMILY MEDICINE CLINIC | Age: 41
End: 2021-12-06

## 2021-12-06 DIAGNOSIS — R51.9 NONINTRACTABLE HEADACHE, UNSPECIFIED CHRONICITY PATTERN, UNSPECIFIED HEADACHE TYPE: Primary | ICD-10-CM

## 2021-12-06 LAB
INTERNAL QC: NORMAL
SARS-COV-2, NAA: POSITIVE

## 2021-12-06 NOTE — TELEPHONE ENCOUNTER
Pt calling with the worst headache of her life, starting yesterday. She feels freezing cold as well. NO temp. Not sure if she's been around anyone with covid. Pls advise.

## 2021-12-06 NOTE — TELEPHONE ENCOUNTER
Pt informed. She states she just started running a fever and wondering if it could be covid? Can we fax an order to [facility]?

## 2021-12-06 NOTE — TELEPHONE ENCOUNTER
\"worst headache of her life\" would necessitate ER visit.   While covid, migraine, others are a possibility, if headache is that severe, likely needs further, acute evaluation including possible CT of head

## 2021-12-06 NOTE — RESULT ENCOUNTER NOTE
Covid positive. Patient would qualify for monoclonal antibodies if she would like to proceed with that. It would likely decrease risk of severe disease or hospitalization. Otherwise symptomatic treatment is recommended. Tylenol as needed for fevers and pain. Keep well-hydrated. Call if increased shortness of breath. She will need to self quarantine until 10 days from symptom onset, improving symptoms, and afebrile for 48 hours.

## 2021-12-06 NOTE — RESULT ENCOUNTER NOTE
Also recommended his vitamin C, vitamin D, and zinc.  Consider aspirin 325 mg daily for 2 to 3 weeks to decrease risk of any blood clots

## 2023-11-06 ENCOUNTER — OFFICE VISIT (OUTPATIENT)
Dept: FAMILY MEDICINE CLINIC | Age: 43
End: 2023-11-06
Payer: COMMERCIAL

## 2023-11-06 VITALS
DIASTOLIC BLOOD PRESSURE: 86 MMHG | SYSTOLIC BLOOD PRESSURE: 122 MMHG | OXYGEN SATURATION: 98 % | HEART RATE: 74 BPM | HEIGHT: 62 IN | WEIGHT: 172.4 LBS | BODY MASS INDEX: 31.73 KG/M2

## 2023-11-06 DIAGNOSIS — R79.89 LOW VITAMIN B12 LEVEL: ICD-10-CM

## 2023-11-06 DIAGNOSIS — Z80.0 FAMILY HISTORY OF COLON CANCER: ICD-10-CM

## 2023-11-06 DIAGNOSIS — Z13.220 SCREENING, LIPID: ICD-10-CM

## 2023-11-06 DIAGNOSIS — Z00.00 WELL ADULT EXAM: Primary | ICD-10-CM

## 2023-11-06 DIAGNOSIS — R53.83 OTHER FATIGUE: ICD-10-CM

## 2023-11-06 DIAGNOSIS — K63.5 POLYP OF SIGMOID COLON, UNSPECIFIED TYPE: ICD-10-CM

## 2023-11-06 LAB
T4 FREE SERPL-MCNC: 1.2 NG/DL (ref 0.9–1.8)
TSH SERPL DL<=0.005 MIU/L-ACNC: 1.56 UIU/ML (ref 0.27–4.2)

## 2023-11-06 PROCEDURE — 36415 COLL VENOUS BLD VENIPUNCTURE: CPT | Performed by: FAMILY MEDICINE

## 2023-11-06 PROCEDURE — G8484 FLU IMMUNIZE NO ADMIN: HCPCS | Performed by: FAMILY MEDICINE

## 2023-11-06 PROCEDURE — 99396 PREV VISIT EST AGE 40-64: CPT | Performed by: FAMILY MEDICINE

## 2023-11-06 ASSESSMENT — PATIENT HEALTH QUESTIONNAIRE - PHQ9
SUM OF ALL RESPONSES TO PHQ QUESTIONS 1-9: 0
2. FEELING DOWN, DEPRESSED OR HOPELESS: 0
SUM OF ALL RESPONSES TO PHQ QUESTIONS 1-9: 0
SUM OF ALL RESPONSES TO PHQ9 QUESTIONS 1 & 2: 0
1. LITTLE INTEREST OR PLEASURE IN DOING THINGS: 0
SUM OF ALL RESPONSES TO PHQ QUESTIONS 1-9: 0
SUM OF ALL RESPONSES TO PHQ QUESTIONS 1-9: 0

## 2023-11-06 ASSESSMENT — ENCOUNTER SYMPTOMS
SHORTNESS OF BREATH: 0
ABDOMINAL PAIN: 1
DIARRHEA: 0
CONSTIPATION: 0
CHEST TIGHTNESS: 0
BLOOD IN STOOL: 0

## 2023-11-07 LAB
ALBUMIN SERPL-MCNC: 4.3 G/DL (ref 3.4–5)
ALBUMIN/GLOB SERPL: 2 {RATIO} (ref 1.1–2.2)
ALP SERPL-CCNC: 77 U/L (ref 40–129)
ALT SERPL-CCNC: 8 U/L (ref 10–40)
ANION GAP SERPL CALCULATED.3IONS-SCNC: 9 MMOL/L (ref 3–16)
AST SERPL-CCNC: 11 U/L (ref 15–37)
BILIRUB SERPL-MCNC: 0.4 MG/DL (ref 0–1)
BUN SERPL-MCNC: 8 MG/DL (ref 7–20)
CALCIUM SERPL-MCNC: 9.2 MG/DL (ref 8.3–10.6)
CHLORIDE SERPL-SCNC: 107 MMOL/L (ref 99–110)
CHOLEST SERPL-MCNC: 151 MG/DL (ref 0–199)
CO2 SERPL-SCNC: 25 MMOL/L (ref 21–32)
CREAT SERPL-MCNC: 0.6 MG/DL (ref 0.6–1.1)
GFR SERPLBLD CREATININE-BSD FMLA CKD-EPI: >60 ML/MIN/{1.73_M2}
GLUCOSE SERPL-MCNC: 85 MG/DL (ref 70–99)
HDLC SERPL-MCNC: 52 MG/DL (ref 40–60)
LDLC SERPL CALC-MCNC: 86 MG/DL
POTASSIUM SERPL-SCNC: 4 MMOL/L (ref 3.5–5.1)
PROT SERPL-MCNC: 6.5 G/DL (ref 6.4–8.2)
SODIUM SERPL-SCNC: 141 MMOL/L (ref 136–145)
TRIGL SERPL-MCNC: 66 MG/DL (ref 0–150)
VIT B12 SERPL-MCNC: 767 PG/ML (ref 211–911)
VLDLC SERPL CALC-MCNC: 13 MG/DL

## 2023-11-07 NOTE — RESULT ENCOUNTER NOTE
CMP okay. Thyroid labs normal.  Lipids well controlled, improved from last check. B12 has improved as well.

## 2024-02-16 ENCOUNTER — OFFICE VISIT (OUTPATIENT)
Dept: FAMILY MEDICINE CLINIC | Age: 44
End: 2024-02-16
Payer: COMMERCIAL

## 2024-02-16 VITALS
BODY MASS INDEX: 32.76 KG/M2 | TEMPERATURE: 102.7 F | WEIGHT: 178 LBS | DIASTOLIC BLOOD PRESSURE: 86 MMHG | HEART RATE: 102 BPM | HEIGHT: 62 IN | OXYGEN SATURATION: 98 % | SYSTOLIC BLOOD PRESSURE: 122 MMHG

## 2024-02-16 DIAGNOSIS — J10.1 INFLUENZA A: Primary | ICD-10-CM

## 2024-02-16 DIAGNOSIS — R68.89 FLU-LIKE SYMPTOMS: ICD-10-CM

## 2024-02-16 LAB
INFLUENZA A ANTIGEN, POC: POSITIVE
INFLUENZA B ANTIGEN, POC: NEGATIVE

## 2024-02-16 PROCEDURE — G8427 DOCREV CUR MEDS BY ELIG CLIN: HCPCS

## 2024-02-16 PROCEDURE — 99214 OFFICE O/P EST MOD 30 MIN: CPT

## 2024-02-16 PROCEDURE — 87804 INFLUENZA ASSAY W/OPTIC: CPT

## 2024-02-16 PROCEDURE — G8484 FLU IMMUNIZE NO ADMIN: HCPCS

## 2024-02-16 PROCEDURE — 1036F TOBACCO NON-USER: CPT

## 2024-02-16 PROCEDURE — G8417 CALC BMI ABV UP PARAM F/U: HCPCS

## 2024-02-16 RX ORDER — OSELTAMIVIR PHOSPHATE 75 MG/1
75 CAPSULE ORAL 2 TIMES DAILY
Qty: 20 CAPSULE | Refills: 0 | Status: SHIPPED | OUTPATIENT
Start: 2024-02-16 | End: 2024-02-26

## 2024-02-16 NOTE — PROGRESS NOTES
Chief Complaint   Patient presents with    Congestion     Worse x2 days    Headache    Lower Back Pain     X1 day    Fever     Current fever started yesterday    Cough       HPI:  Brionna Ralph is a 43 y.o. (: 1980) here today for cough, congestion, chills, sore throat, low back pain,body aches fever x2 days.  Took COVID test this morning at home and was negative.  Temp 102.7 Fahrenheit today.    Positive for influenza A in office today.    Patient's medications, allergies, past medical, surgical, social and family histories were reviewed and updated asappropriate.    ROS:  Review of Systems   Constitutional:  Positive for chills, diaphoresis, fatigue and fever.   HENT:  Positive for congestion, sinus pressure and sore throat. Negative for ear pain and trouble swallowing.    Respiratory:  Positive for cough.    Cardiovascular: Negative.    Gastrointestinal: Negative.    Genitourinary: Negative.    Musculoskeletal:  Positive for back pain and myalgias.   Neurological:  Positive for headaches.   Psychiatric/Behavioral: Negative.             Prior to Visit Medications    Medication Sig Taking? Authorizing Provider   Cyanocobalamin (VITAMIN B-12 PO) Take by mouth Yes Vandana Dickey MD   oseltamivir (TAMIFLU) 75 MG capsule Take 1 capsule by mouth 2 times daily for 10 days Yes Leo Tripathi, APRN - CNP   IUD'S IU by IntraUTERine route Yes ProviderVandana MD       No Known Allergies    OBJECTIVE:    BP (!) 122/96   Pulse (!) 102   Temp (!) 102.7 °F (39.3 °C)   Ht 1.575 m (5' 2\")   Wt 80.7 kg (178 lb)   SpO2 98%   BMI 32.56 kg/m²     BP Readings from Last 2 Encounters:   24 (!) 122/96   23 121/72       Wt Readings from Last 3 Encounters:   24 80.7 kg (178 lb)   23 77.1 kg (170 lb)   23 78.2 kg (172 lb 6.4 oz)       Physical Exam  Constitutional:       Appearance: Normal appearance.   HENT:      Right Ear: External ear normal.      Left Ear: External ear

## 2025-04-03 ENCOUNTER — RESULTS FOLLOW-UP (OUTPATIENT)
Dept: FAMILY MEDICINE CLINIC | Age: 45
End: 2025-04-03

## 2025-04-03 NOTE — RESULT ENCOUNTER NOTE
Please make sure test has been addressed by the ordering provider.  Appears additional imaging may be necessary

## (undated) DEVICE — FORCEP BX STD CAP 240CM RAD JAW 4

## (undated) DEVICE — ENDO CARRY-ON PROCEDURE KIT INCLUDES SUCTION TUBING, LUBRICANT, GAUZE, BIOHAZARD STICKER, TRANSPORT PAD AND INTERCEPT BEDSIDE KIT.: Brand: ENDO CARRY-ON PROCEDURE KIT

## (undated) DEVICE — ELECTRODE ECG MONITR FOAM TEAR DROP ADLT RED